# Patient Record
Sex: FEMALE | URBAN - METROPOLITAN AREA
[De-identification: names, ages, dates, MRNs, and addresses within clinical notes are randomized per-mention and may not be internally consistent; named-entity substitution may affect disease eponyms.]

---

## 2022-05-06 ENCOUNTER — LAB REQUISITION (OUTPATIENT)
Dept: LAB | Facility: CLINIC | Age: 22
End: 2022-05-06

## 2022-05-06 PROCEDURE — 86481 TB AG RESPONSE T-CELL SUSP: CPT | Performed by: INTERNAL MEDICINE

## 2022-05-08 LAB
GAMMA INTERFERON BACKGROUND BLD IA-ACNC: 0.01 IU/ML
M TB IFN-G BLD-IMP: NEGATIVE
M TB IFN-G CD4+ BCKGRND COR BLD-ACNC: 9.99 IU/ML
MITOGEN IGNF BCKGRD COR BLD-ACNC: 0 IU/ML
MITOGEN IGNF BCKGRD COR BLD-ACNC: 0.01 IU/ML
QUANTIFERON MITOGEN: 10 IU/ML
QUANTIFERON NIL TUBE: 0.01 IU/ML
QUANTIFERON TB1 TUBE: 0.01 IU/ML
QUANTIFERON TB2 TUBE: 0.02

## 2022-07-24 ENCOUNTER — HEALTH MAINTENANCE LETTER (OUTPATIENT)
Age: 22
End: 2022-07-24

## 2022-08-31 ASSESSMENT — ANXIETY QUESTIONNAIRES
6. BECOMING EASILY ANNOYED OR IRRITABLE: MORE THAN HALF THE DAYS
4. TROUBLE RELAXING: NOT AT ALL
7. FEELING AFRAID AS IF SOMETHING AWFUL MIGHT HAPPEN: SEVERAL DAYS
8. IF YOU CHECKED OFF ANY PROBLEMS, HOW DIFFICULT HAVE THESE MADE IT FOR YOU TO DO YOUR WORK, TAKE CARE OF THINGS AT HOME, OR GET ALONG WITH OTHER PEOPLE?: SOMEWHAT DIFFICULT
GAD7 TOTAL SCORE: 6
GAD7 TOTAL SCORE: 6
1. FEELING NERVOUS, ANXIOUS, OR ON EDGE: SEVERAL DAYS
5. BEING SO RESTLESS THAT IT IS HARD TO SIT STILL: SEVERAL DAYS
IF YOU CHECKED OFF ANY PROBLEMS ON THIS QUESTIONNAIRE, HOW DIFFICULT HAVE THESE PROBLEMS MADE IT FOR YOU TO DO YOUR WORK, TAKE CARE OF THINGS AT HOME, OR GET ALONG WITH OTHER PEOPLE: SOMEWHAT DIFFICULT
3. WORRYING TOO MUCH ABOUT DIFFERENT THINGS: NOT AT ALL
2. NOT BEING ABLE TO STOP OR CONTROL WORRYING: SEVERAL DAYS
7. FEELING AFRAID AS IF SOMETHING AWFUL MIGHT HAPPEN: SEVERAL DAYS

## 2022-09-07 ENCOUNTER — OFFICE VISIT (OUTPATIENT)
Dept: OBGYN | Facility: CLINIC | Age: 22
End: 2022-09-07
Attending: ADVANCED PRACTICE MIDWIFE
Payer: COMMERCIAL

## 2022-09-07 VITALS
HEIGHT: 66 IN | DIASTOLIC BLOOD PRESSURE: 80 MMHG | BODY MASS INDEX: 26.6 KG/M2 | WEIGHT: 165.5 LBS | SYSTOLIC BLOOD PRESSURE: 132 MMHG | HEART RATE: 86 BPM

## 2022-09-07 DIAGNOSIS — R87.612 PAPANICOLAOU SMEAR OF CERVIX WITH LOW GRADE SQUAMOUS INTRAEPITHELIAL LESION (LGSIL): ICD-10-CM

## 2022-09-07 DIAGNOSIS — Z30.430 ENCOUNTER FOR IUD INSERTION: Primary | ICD-10-CM

## 2022-09-07 DIAGNOSIS — Z30.430 ENCOUNTER FOR INSERTION OF INTRAUTERINE CONTRACEPTIVE DEVICE: ICD-10-CM

## 2022-09-07 DIAGNOSIS — Z12.4 SCREENING FOR MALIGNANT NEOPLASM OF CERVIX: ICD-10-CM

## 2022-09-07 LAB
HCG UR QL: NEGATIVE
INTERNAL QC OK POCT: NORMAL
POCT KIT EXPIRATION DATE: NORMAL
POCT KIT LOT NUMBER: NORMAL

## 2022-09-07 PROCEDURE — 81025 URINE PREGNANCY TEST: CPT | Performed by: ADVANCED PRACTICE MIDWIFE

## 2022-09-07 PROCEDURE — 250N000011 HC RX IP 250 OP 636: Performed by: ADVANCED PRACTICE MIDWIFE

## 2022-09-07 PROCEDURE — G0145 SCR C/V CYTO,THINLAYER,RESCR: HCPCS | Performed by: ADVANCED PRACTICE MIDWIFE

## 2022-09-07 PROCEDURE — 250N000013 HC RX MED GY IP 250 OP 250 PS 637: Performed by: ADVANCED PRACTICE MIDWIFE

## 2022-09-07 PROCEDURE — G0463 HOSPITAL OUTPT CLINIC VISIT: HCPCS | Mod: 25

## 2022-09-07 PROCEDURE — 58300 INSERT INTRAUTERINE DEVICE: CPT | Performed by: ADVANCED PRACTICE MIDWIFE

## 2022-09-07 RX ORDER — NORGESTIMATE AND ETHINYL ESTRADIOL 7DAYSX3 LO
1 KIT ORAL DAILY
COMMUNITY
Start: 2022-06-16 | End: 2022-09-07

## 2022-09-07 RX ORDER — IBUPROFEN 200 MG
200 TABLET ORAL ONCE
Status: COMPLETED | OUTPATIENT
Start: 2022-09-07 | End: 2022-09-07

## 2022-09-07 RX ADMIN — LEVONORGESTREL 20 MCG: 52 INTRAUTERINE DEVICE INTRAUTERINE at 16:11

## 2022-09-07 RX ADMIN — IBUPROFEN 600 MG: 200 TABLET, FILM COATED ORAL at 15:55

## 2022-09-07 ASSESSMENT — PATIENT HEALTH QUESTIONNAIRE - PHQ9
5. POOR APPETITE OR OVEREATING: NOT AT ALL
SUM OF ALL RESPONSES TO PHQ QUESTIONS 1-9: 8

## 2022-09-07 ASSESSMENT — ANXIETY QUESTIONNAIRES
7. FEELING AFRAID AS IF SOMETHING AWFUL MIGHT HAPPEN: NOT AT ALL
6. BECOMING EASILY ANNOYED OR IRRITABLE: SEVERAL DAYS
5. BEING SO RESTLESS THAT IT IS HARD TO SIT STILL: SEVERAL DAYS
IF YOU CHECKED OFF ANY PROBLEMS ON THIS QUESTIONNAIRE, HOW DIFFICULT HAVE THESE PROBLEMS MADE IT FOR YOU TO DO YOUR WORK, TAKE CARE OF THINGS AT HOME, OR GET ALONG WITH OTHER PEOPLE: SOMEWHAT DIFFICULT
2. NOT BEING ABLE TO STOP OR CONTROL WORRYING: NOT AT ALL
3. WORRYING TOO MUCH ABOUT DIFFERENT THINGS: SEVERAL DAYS
1. FEELING NERVOUS, ANXIOUS, OR ON EDGE: NOT AT ALL
GAD7 TOTAL SCORE: 3

## 2022-09-07 NOTE — PROGRESS NOTES
"  IUD Insertion:  CONSULT:    Is a pregnancy test required: Yes.  Was it positive or negative?  Negative  Was a consent obtained?  Yes    Subjective: Gianna Fairchild is a 22 year old  presents for IUD and desires Mirena type IUD.    Patient has been given the opportunity to ask questions about all forms of birth control, including all options appropriate for Gianna Fairchild. Discussed that no method of birth control, except abstinence is 100% effective against pregnancy or sexually transmitted infection.     Gianna Fairchild understands she may have the IUD removed at any time. IUD should be removed by a health care provider.    The entire insertion procedure was reviewed with the patient, including care after placement.    Patient's last menstrual period was 2022 (exact date). Last sexual activity: 5-6 months ago. No allergy to betadine or shellfish. Patient declines STD screening  No results found for: HCG      /80 (BP Location: Left arm, Patient Position: Sitting, Cuff Size: Adult Regular)   Pulse 86   Ht 1.676 m (5' 6\")   Wt 75.1 kg (165 lb 8 oz)   LMP 2022 (Exact Date)   Breastfeeding No   BMI 26.71 kg/m      Pelvic Exam:   EG/BUS: normal genital architecture without lesions, erythema or abnormal secretions.   Vagina: moist, pink, rugae with physiologic discharge and secretions  Pap smear obtained    PROCEDURE NOTE: -- IUD Insertion    Reason for Insertion: contraception    Premedicated with ibuprofen.  Under sterile technique, cervix was visualized with speculum and prepped with Betadine solution swab x 3. Tenaculum was placed for stability. The uterus was gently straightened and sounded to 7.0 cm. IUD prepared for placement, and IUD inserted according to 's instructions without difficulty or significant resitance, and deployed at the fundus. The strings were visualized and trimmed to 3.0 cm from the external os. Tenaculum was removed and hemostasis noted. Speculum " removed.  Patient tolerated procedure well.    Lot # [id number]  Exp: 10/2024    EBL: minimal    Complications: none    ASSESSMENT:     ICD-10-CM    1. Encounter for IUD insertion  Z30.430 levonorgestrel (MIRENA) 20 MCG/DAY IUD     levonorgestrel (MIRENA) 20 MCG/DAY IUD 20 mcg     INSERTION INTRAUTERINE DEVICE   2. Screening for malignant neoplasm of cervix  Z12.4 Obtaining, preparing and conveyance of cervical or vaginal smear to laboratory.     Pap thin layer screen only - recommended age 21 - 24 years   3. Encounter for insertion of intrauterine contraceptive device  Z30.430         PLAN:    Given 's handouts, including when to have IUD removed, list of danger s/sx, side effects and follow up recommended. Encouraged condom use for prevention of STD. Back up contraception advised for 7 days if progestin method. Advised to call for any fever, for prolonged or severe pain or bleeding, abnormal vaginal discharge, or unable to palpate strings. She was advised to use pain medications (ibuprofen) as needed for mild to moderate pain. Advised to follow-up in clinic in 4-6 weeks for IUD string check if unable to find strings or as directed by provider.     Leena Lemon, JONNY TOMAS    Answers for HPI/ROS submitted by the patient on 8/31/2022  KATI 7 TOTAL SCORE: 6

## 2022-09-07 NOTE — NURSING NOTE
Chief Complaint   Patient presents with     Physical     Annual/pap last pap was 05.07.2021 LSIL . Wants Mirena IUD

## 2022-09-07 NOTE — LETTER
"2022       RE: Gianna Fairchild  [address]  [address]  [facility] [address]     Dear Colleague,    Thank you for referring your patient, Gianna Fairchild, to the [facility] [place] at [facility]. Please see a copy of my visit note below.      IUD Insertion:  CONSULT:    Is a pregnancy test required: Yes.  Was it positive or negative?  Negative  Was a consent obtained?  Yes    Subjective: Gianna Fairchild is a 22 year old  presents for IUD and desires Mirena type IUD.    Patient has been given the opportunity to ask questions about all forms of birth control, including all options appropriate for Gianna Fairchild. Discussed that no method of birth control, except abstinence is 100% effective against pregnancy or sexually transmitted infection.     Gianna Fairchild understands she may have the IUD removed at any time. IUD should be removed by a health care provider.    The entire insertion procedure was reviewed with the patient, including care after placement.    Patient's last menstrual period was 2022 (exact date). Last sexual activity: 5-6 months ago. No allergy to betadine or shellfish. Patient declines STD screening  No results found for: HCG      /80 (BP Location: Left arm, Patient Position: Sitting, Cuff Size: Adult Regular)   Pulse 86   Ht 1.676 m (5' 6\")   Wt 75.1 kg (165 lb 8 oz)   LMP 2022 (Exact Date)   Breastfeeding No   BMI 26.71 kg/m      Pelvic Exam:   EG/BUS: normal genital architecture without lesions, erythema or abnormal secretions.   Vagina: moist, pink, rugae with physiologic discharge and secretions  Pap smear obtained    PROCEDURE NOTE: -- IUD Insertion    Reason for Insertion: contraception    Premedicated with ibuprofen.  Under sterile technique, cervix was visualized with speculum and prepped with Betadine solution swab x 3. Tenaculum was placed for stability. The uterus was gently " straightened and sounded to 7.0 cm. IUD prepared for placement, and IUD inserted according to 's instructions without difficulty or significant resitance, and deployed at the fundus. The strings were visualized and trimmed to 3.0 cm from the external os. Tenaculum was removed and hemostasis noted. Speculum removed.  Patient tolerated procedure well.    Lot # [id number]  Exp: 10/2024    EBL: minimal    Complications: none    ASSESSMENT:     ICD-10-CM    1. Encounter for IUD insertion  Z30.430 levonorgestrel (MIRENA) 20 MCG/DAY IUD     levonorgestrel (MIRENA) 20 MCG/DAY IUD 20 mcg     INSERTION INTRAUTERINE DEVICE   2. Screening for malignant neoplasm of cervix  Z12.4 Obtaining, preparing and conveyance of cervical or vaginal smear to laboratory.     Pap thin layer screen only - recommended age 21 - 24 years   3. Encounter for insertion of intrauterine contraceptive device  Z30.430         PLAN:    Given 's handouts, including when to have IUD removed, list of danger s/sx, side effects and follow up recommended. Encouraged condom use for prevention of STD. Back up contraception advised for 7 days if progestin method. Advised to call for any fever, for prolonged or severe pain or bleeding, abnormal vaginal discharge, or unable to palpate strings. She was advised to use pain medications (ibuprofen) as needed for mild to moderate pain. Advised to follow-up in clinic in 4-6 weeks for IUD string check if unable to find strings or as directed by provider.     JONNY Edwards CNM    Answers for HPI/ROS submitted by the patient on 8/31/2022  KATI 7 TOTAL SCORE: 6          Again, thank you for allowing me to participate in the care of your patient.      Sincerely,    JONNY Edwards CNM

## 2022-09-07 NOTE — LETTER
Date:September 8, 2022      Provider requested that no letter be sent. Do not send.       [facility]

## 2022-09-09 LAB
BKR LAB AP GYN ADEQUACY: NORMAL
BKR LAB AP GYN INTERPRETATION: NORMAL
BKR LAB AP HPV REFLEX: NO
BKR LAB AP LMP: NORMAL
BKR LAB AP PREVIOUS ABNL DX: NORMAL
BKR LAB AP PREVIOUS ABNORMAL: NORMAL
PATH REPORT.COMMENTS IMP SPEC: NORMAL
PATH REPORT.COMMENTS IMP SPEC: NORMAL
PATH REPORT.RELEVANT HX SPEC: NORMAL

## 2022-10-03 ENCOUNTER — HEALTH MAINTENANCE LETTER (OUTPATIENT)
Age: 22
End: 2022-10-03

## 2022-10-05 ENCOUNTER — OFFICE VISIT (OUTPATIENT)
Dept: OBGYN | Facility: CLINIC | Age: 22
End: 2022-10-05
Attending: ADVANCED PRACTICE MIDWIFE
Payer: COMMERCIAL

## 2022-10-05 VITALS
BODY MASS INDEX: 27.51 KG/M2 | WEIGHT: 171.2 LBS | HEIGHT: 66 IN | SYSTOLIC BLOOD PRESSURE: 128 MMHG | HEART RATE: 86 BPM | DIASTOLIC BLOOD PRESSURE: 84 MMHG

## 2022-10-05 DIAGNOSIS — Z01.419 WELL WOMAN EXAM WITH ROUTINE GYNECOLOGICAL EXAM: Primary | ICD-10-CM

## 2022-10-05 PROCEDURE — G0463 HOSPITAL OUTPT CLINIC VISIT: HCPCS

## 2022-10-05 PROCEDURE — 99395 PREV VISIT EST AGE 18-39: CPT | Performed by: ADVANCED PRACTICE MIDWIFE

## 2022-10-05 ASSESSMENT — ANXIETY QUESTIONNAIRES
1. FEELING NERVOUS, ANXIOUS, OR ON EDGE: SEVERAL DAYS
7. FEELING AFRAID AS IF SOMETHING AWFUL MIGHT HAPPEN: NOT AT ALL
3. WORRYING TOO MUCH ABOUT DIFFERENT THINGS: NOT AT ALL
3. WORRYING TOO MUCH ABOUT DIFFERENT THINGS: NOT AT ALL
6. BECOMING EASILY ANNOYED OR IRRITABLE: SEVERAL DAYS
4. TROUBLE RELAXING: SEVERAL DAYS
7. FEELING AFRAID AS IF SOMETHING AWFUL MIGHT HAPPEN: NOT AT ALL
5. BEING SO RESTLESS THAT IT IS HARD TO SIT STILL: MORE THAN HALF THE DAYS
GAD7 TOTAL SCORE: 4
GAD7 TOTAL SCORE: 7
2. NOT BEING ABLE TO STOP OR CONTROL WORRYING: NOT AT ALL
7. FEELING AFRAID AS IF SOMETHING AWFUL MIGHT HAPPEN: NOT AT ALL
5. BEING SO RESTLESS THAT IT IS HARD TO SIT STILL: NOT AT ALL
6. BECOMING EASILY ANNOYED OR IRRITABLE: MORE THAN HALF THE DAYS
IF YOU CHECKED OFF ANY PROBLEMS ON THIS QUESTIONNAIRE, HOW DIFFICULT HAVE THESE PROBLEMS MADE IT FOR YOU TO DO YOUR WORK, TAKE CARE OF THINGS AT HOME, OR GET ALONG WITH OTHER PEOPLE: SOMEWHAT DIFFICULT
2. NOT BEING ABLE TO STOP OR CONTROL WORRYING: SEVERAL DAYS
GAD7 TOTAL SCORE: 7
8. IF YOU CHECKED OFF ANY PROBLEMS, HOW DIFFICULT HAVE THESE MADE IT FOR YOU TO DO YOUR WORK, TAKE CARE OF THINGS AT HOME, OR GET ALONG WITH OTHER PEOPLE?: SOMEWHAT DIFFICULT
1. FEELING NERVOUS, ANXIOUS, OR ON EDGE: MORE THAN HALF THE DAYS

## 2022-10-05 ASSESSMENT — PATIENT HEALTH QUESTIONNAIRE - PHQ9
5. POOR APPETITE OR OVEREATING: SEVERAL DAYS
SUM OF ALL RESPONSES TO PHQ QUESTIONS 1-9: 8

## 2022-10-05 NOTE — PROGRESS NOTES
Progress Note    SUBJECTIVE:  Gianna Fairchild is an 22 year old, , who requests an Annual Preventive Exam.     Concerns today include: Feels well overall. No concerns and happy with her IUD so far. Endorses some mild unscheduled spotting and a lighter first period. Has not been sexually active since placement of IUD.    Menstrual History:  Menstrual History 2022 10/5/2022   LAST MENSTRUAL PERIOD 2022       Last Pap 2022: NILM; next pap due 2023  History of abnormal Pap smear: LGSIL on 2021, normal pap 2022, recommend 1-year follow-up with cytology per ASCCP guidelines.       Mammogram current: not applicable       Last Colonoscopy: not applicable        HISTORY:  Prescription Medications as of 10/5/2022       Rx Number Disp Refills Start End Last Dispensed Date Next Fill Date Owning Pharmacy    levonorgestrel (MIRENA) 20 MCG/DAY IUD        [facility] [address]    Si each (20 mcg) by Intrauterine route once    Class: Historical    Route: Intrauterine        No Known Allergies    There is no immunization history on file for this patient.    OB History    Para Term  AB Living   0 0 0 0 0 0   SAB IAB Ectopic Multiple Live Births   0 0 0 0 0     No past medical history on file.  No past surgical history on file.  Family History   Problem Relation Age of Onset     Heart Disease Mother      Hypertension Father      Social History     Socioeconomic History     Marital status: Single     Spouse name: None     Number of children: None     Years of education: None     Highest education level: None   Tobacco Use     Smoking status: Never Smoker     Smokeless tobacco: Never Used   Vaping Use     Vaping Use: Never used   Substance and Sexual Activity     Drug use: Never     Sexual activity: Yes     Partners: Male     Birth control/protection: Pill     Social Determinants of Health     Intimate Partner Violence: Not At Risk  "    Fear of Current or Ex-Partner: No     Emotionally Abused: No     Physically Abused: No     Sexually Abused: No       Review of Systems     All other systems reviewed and are negative.    PHQ-9 SCORE 9/7/2022 10/5/2022   PHQ-9 Total Score 8 8       EXAM:  Blood pressure 128/84, pulse 86, height 1.676 m (5' 6\"), weight 77.7 kg (171 lb 3.2 oz), last menstrual period 08/23/2022, not currently breastfeeding. Body mass index is 27.63 kg/m .  General - pleasant female in no acute distress.  Skin - no suspicious lesions or rashes  EENT-  PERRLA, euthyroid with out palpable nodules  Neck - supple without lymphadenopathy.  Lungs - clear to auscultation bilaterally.  Heart - regular rate and rhythm without murmur.  Abdomen - soft, nontender, nondistended, no masses or organomegaly noted.  Musculoskeletal - no gross deformities.  Neurological - normal strength, sensation, and mental status.      Pelvic Exam:  EG/BUS: Normal genital architecture without lesions, erythema or abnormal secretions. Bartholin's, Urethra, [place]'s normal   Bladder: no masses or tenderness   Vagina: moist, pink, rugae with creamy, white, odorless and physiologic discharge  secretions  Cervix: no lesions palpated and IUD strings extend ~3 cm from external os.  Uterus: midposition, and small, smooth, firm, mobile w/o pain  Adnexa: Within normal limits and No masses, nodularity, tenderness      ASSESSMENT:  Encounter Diagnosis   Name Primary?     Well woman exam with routine gynecological exam Yes        PLAN:   No orders of the defined types were placed in this encounter.       Additional teaching done at this visit regarding self breast exam, exercise and birth control- how to check for IUD strings.    Return to clinic in one year.  Follow-up as needed.    IKristin RN, NP-Student am serving as a scribe; to document services personally performed by  Leena Lemon CNM based on data collection and the provider's statements to me. "     Kristin Grover RN,  WHNP-Student     I agree with the PFSH and ROS as completed by Kristin Grover RN,  WHNP-Student except for changes made by me. The remainder of the encounter was performed by me and scribed by Kristin Grover RN,  WHNP-Student. The scribed note accurately reflects my personal services and decisions made by me.   JONNY Edwards CNM

## 2022-10-05 NOTE — LETTER
Date:October 7, 2022      Provider requested that no letter be sent. Do not send.       [facility]

## 2022-10-05 NOTE — LETTER
10/5/2022       RE: Gianna Fairchild  [address]  [address]  [facility] [address]     Dear Colleague,    Thank you for referring your patient, Gianna Fairchild, to the [facility] [place] at [facility]. Please see a copy of my visit note below.        Progress Note    SUBJECTIVE:  Gianna Fairchild is an 22 year old, , who requests an Annual Preventive Exam.     Concerns today include: Feels well overall. No concerns and happy with her IUD so far. Endorses some mild unscheduled spotting and a lighter first period. Has not been sexually active since placement of IUD.    Menstrual History:  Menstrual History 2022 10/5/2022   LAST MENSTRUAL PERIOD 2022       Last Pap 2022: NILM; next pap due 2023  History of abnormal Pap smear: LGSIL on 2021, normal pap 2022, recommend 1-year follow-up with cytology per ASCCP guidelines.       Mammogram current: not applicable       Last Colonoscopy: not applicable        HISTORY:  Prescription Medications as of 10/5/2022       Rx Number Disp Refills Start End Last Dispensed Date Next Fill Date Owning Pharmacy    levonorgestrel (MIRENA) 20 MCG/DAY IUD        [facility] [address]    Si each (20 mcg) by Intrauterine route once    Class: Historical    Route: Intrauterine        No Known Allergies    There is no immunization history on file for this patient.    OB History    Para Term  AB Living   0 0 0 0 0 0   SAB IAB Ectopic Multiple Live Births   0 0 0 0 0     No past medical history on file.  No past surgical history on file.  Family History   Problem Relation Age of Onset     Heart Disease Mother      Hypertension Father      Social History     Socioeconomic History     Marital status: Single     Spouse name: None     Number of children: None     Years of education: None     Highest education level: None   Tobacco  "Use     Smoking status: Never Smoker     Smokeless tobacco: Never Used   Vaping Use     Vaping Use: Never used   Substance and Sexual Activity     Drug use: Never     Sexual activity: Yes     Partners: Male     Birth control/protection: Pill     Social Determinants of Health     Intimate Partner Violence: Not At Risk     Fear of Current or Ex-Partner: No     Emotionally Abused: No     Physically Abused: No     Sexually Abused: No       Review of Systems     All other systems reviewed and are negative.    PHQ-9 SCORE 9/7/2022 10/5/2022   PHQ-9 Total Score 8 8       EXAM:  Blood pressure 128/84, pulse 86, height 1.676 m (5' 6\"), weight 77.7 kg (171 lb 3.2 oz), last menstrual period 08/23/2022, not currently breastfeeding. Body mass index is 27.63 kg/m .  General - pleasant female in no acute distress.  Skin - no suspicious lesions or rashes  EENT-  PERRLA, euthyroid with out palpable nodules  Neck - supple without lymphadenopathy.  Lungs - clear to auscultation bilaterally.  Heart - regular rate and rhythm without murmur.  Abdomen - soft, nontender, nondistended, no masses or organomegaly noted.  Musculoskeletal - no gross deformities.  Neurological - normal strength, sensation, and mental status.      Pelvic Exam:  EG/BUS: Normal genital architecture without lesions, erythema or abnormal secretions. Bartholin's, Urethra, [place]'s normal   Bladder: no masses or tenderness   Vagina: moist, pink, rugae with creamy, white, odorless and physiologic discharge  secretions  Cervix: no lesions palpated and IUD strings extend ~3 cm from external os.  Uterus: midposition, and small, smooth, firm, mobile w/o pain  Adnexa: Within normal limits and No masses, nodularity, tenderness      ASSESSMENT:  Encounter Diagnosis   Name Primary?     Well woman exam with routine gynecological exam Yes        PLAN:   No orders of the defined types were placed in this encounter.       Additional teaching done at this visit regarding self breast " exam, exercise and birth control- how to check for IUD strings.    Return to clinic in one year.  Follow-up as needed.    I, Kristin Grover RN, WHNP-Student am serving as a scribe; to document services personally performed by  Leena Lemon CNM based on data collection and the provider's statements to me.     Kristin Grover RN,  WHNP-Student     I agree with the PFSH and ROS as completed by Kristin Grover RN,  WHNP-Student except for changes made by me. The remainder of the encounter was performed by me and scribed by Kristin Grover RN,  WHNP-Student. The scribed note accurately reflects my personal services and decisions made by me.   JONNY Edwards CNM                Again, thank you for allowing me to participate in the care of your patient.      Sincerely,    JONNY Edwards CNM

## 2023-03-21 ENCOUNTER — OFFICE VISIT (OUTPATIENT)
Dept: OBGYN | Facility: CLINIC | Age: 23
End: 2023-03-21
Attending: ADVANCED PRACTICE MIDWIFE
Payer: COMMERCIAL

## 2023-03-21 VITALS
DIASTOLIC BLOOD PRESSURE: 83 MMHG | HEIGHT: 66 IN | SYSTOLIC BLOOD PRESSURE: 131 MMHG | WEIGHT: 171 LBS | HEART RATE: 83 BPM | BODY MASS INDEX: 27.48 KG/M2

## 2023-03-21 DIAGNOSIS — L70.8 OTHER ACNE: Primary | ICD-10-CM

## 2023-03-21 PROCEDURE — G0463 HOSPITAL OUTPT CLINIC VISIT: HCPCS | Performed by: ADVANCED PRACTICE MIDWIFE

## 2023-03-21 PROCEDURE — 99213 OFFICE O/P EST LOW 20 MIN: CPT | Performed by: ADVANCED PRACTICE MIDWIFE

## 2023-03-21 RX ORDER — ADAPALENE GEL USP, 0.3% 3 MG/G
GEL TOPICAL
Qty: 60 G | Refills: 3 | Status: SHIPPED | OUTPATIENT
Start: 2023-03-21

## 2023-03-21 NOTE — PROGRESS NOTES
"SUBJECTIVE:   Gianna Fairchild, 23 year female, presents to the clinic with concerns for increasing skin issues. She has a history of ance, states it \"was best managed on COCs\", but pt has Mirena IUD since Sept. 2022. Acne is worsening. She has tried OTC medications like salicylic acid which helps some. She is wondering if she needs a referral to see dermatology. She is happy with her IUD and would like to keep it in place.     OBJECTIVE:   Acne noted on face, shoulders, back of arms bilaterally. No signs of infection. Does not appear cyst-like.     ASSESSMENT/PLAN:     -Discussed that acne can be a side effect of Mirena IUD in about 10% of users.  -Prescribed Differin gel 0.3% to use daily.  -Dermatology referral placed.      Follow up and return to clinic as needed.     I, America Barber DNP Student, am serving as a scribe; to document services personally performed by JONNY Jain CNM based on data collection and the provider's statements to me.     America Barber DNP student    I agree with the PFSH and ROS as completed by America Barber DNP student except for changes made by me. The remainder of the encounter was performed by me and scribed by America Barber DNP student. The scribed note accurately reflects my personal services and decisions made by me.   JONNY Edwards CNM      "

## 2023-03-21 NOTE — LETTER
Date:March 22, 2023      Provider requested that no letter be sent. Do not send.       [facility]

## 2023-03-21 NOTE — LETTER
"3/21/2023       RE: Gianna Fairchild  [address]  [address]  [facility] [address]     Dear Colleague,    Thank you for referring your patient, Gianna Fairchild, to the [facility] [place] at [facility]. Please see a copy of my visit note below.    SUBJECTIVE:   Gianna Fairchild, 23 year female, presents to the clinic with concerns for increasing skin issues. She has a history of ance, states it \"was best managed on COCs\", but pt has Mirena IUD since Sept. 2022. Acne is worsening. She has tried OTC medications like salicylic acid which helps some. She is wondering if she needs a referral to see dermatology. She is happy with her IUD and would like to keep it in place.     OBJECTIVE:   Acne noted on face, shoulders, back of arms bilaterally. No signs of infection. Does not appear cyst-like.     ASSESSMENT/PLAN:     -Discussed that acne can be a side effect of Mirena IUD in about 10% of users.  -Prescribed Differin gel 0.3% to use daily.  -Dermatology referral placed.      Follow up and return to clinic as needed.     I, America Barber DNP Student, am serving as a scribe; to document services personally performed by JONNY Jain CNM based on data collection and the provider's statements to me.     America Barber DNP student    I agree with the PFSH and ROS as completed by America Barber DNP student except for changes made by me. The remainder of the encounter was performed by me and scribed by America Barber DNP student. The scribed note accurately reflects my personal services and decisions made by me.   JONNY Edwards CNM          Again, thank you for allowing me to participate in the care of your patient.      Sincerely,    JONNY Edwards CNM      "

## 2023-05-14 ENCOUNTER — MYC MEDICAL ADVICE (OUTPATIENT)
Dept: OBGYN | Facility: CLINIC | Age: 23
End: 2023-05-14
Payer: COMMERCIAL

## 2023-05-14 DIAGNOSIS — Z11.3 ROUTINE SCREENING FOR STI (SEXUALLY TRANSMITTED INFECTION): Primary | ICD-10-CM

## 2023-05-15 ENCOUNTER — LAB (OUTPATIENT)
Dept: LAB | Facility: CLINIC | Age: 23
End: 2023-05-15
Payer: COMMERCIAL

## 2023-05-15 DIAGNOSIS — Z11.3 ROUTINE SCREENING FOR STI (SEXUALLY TRANSMITTED INFECTION): ICD-10-CM

## 2023-05-15 PROCEDURE — 87491 CHLMYD TRACH DNA AMP PROBE: CPT

## 2023-05-15 PROCEDURE — 87591 N.GONORRHOEAE DNA AMP PROB: CPT

## 2023-05-16 LAB
C TRACH DNA SPEC QL NAA+PROBE: NEGATIVE
N GONORRHOEA DNA SPEC QL NAA+PROBE: NEGATIVE

## 2023-09-15 ENCOUNTER — OFFICE VISIT (OUTPATIENT)
Dept: DERMATOLOGY | Facility: CLINIC | Age: 23
End: 2023-09-15
Attending: ADVANCED PRACTICE MIDWIFE
Payer: COMMERCIAL

## 2023-09-15 DIAGNOSIS — L70.8 OTHER ACNE: ICD-10-CM

## 2023-09-15 DIAGNOSIS — L70.0 ACNE VULGARIS: Primary | ICD-10-CM

## 2023-09-15 PROCEDURE — 99204 OFFICE O/P NEW MOD 45 MIN: CPT | Mod: GC | Performed by: DERMATOLOGY

## 2023-09-15 RX ORDER — TRETINOIN 0.25 MG/G
CREAM TOPICAL AT BEDTIME
Qty: 45 G | Refills: 2 | Status: SHIPPED | OUTPATIENT
Start: 2023-09-15

## 2023-09-15 RX ORDER — BENZOYL PEROXIDE 10 G/100G
SUSPENSION TOPICAL
Qty: 148 ML | Refills: 2 | Status: SHIPPED | OUTPATIENT
Start: 2023-09-15

## 2023-09-15 RX ORDER — SPIRONOLACTONE 50 MG/1
TABLET, FILM COATED ORAL
Qty: 67 TABLET | Refills: 4 | Status: SHIPPED | OUTPATIENT
Start: 2023-09-15 | End: 2024-01-26

## 2023-09-15 RX ORDER — CLINDAMYCIN PHOSPHATE 10 UG/ML
LOTION TOPICAL DAILY
Qty: 60 ML | Refills: 2 | Status: SHIPPED | OUTPATIENT
Start: 2023-09-15

## 2023-09-15 ASSESSMENT — PAIN SCALES - GENERAL: PAINLEVEL: NO PAIN (0)

## 2023-09-15 NOTE — LETTER
9/15/2023       RE: Gianna Fairchild  [address]  [facility] [address]     Dear Colleague,    Thank you for referring your patient, Gianna Fairchild, to the [facility] [place] at [facility]. Please see a copy of my visit note below.    [facility] Dermatology Note  Encounter Date: Sep 15, 2023  Office Visit     Dermatology Problem List:  # Acne Vulgaris , probable hormonal component   - Previous tx: OCP, adapalene, BPO wash   - Current tx: BPO, clinda, tretinoin 0.025%, spironolactone   - Future tx: tretinoin 0.05%, doxycyline and Accutane   ____________________________________________    Assessment & Plan:     # Acne Vulgaris - chronic, active.  Discussed that acne is secondary to follicular occlusion which is exacerbated by hormonal influence. Treatments were discussed at length including topical agents and systemic medications. At this time, plan to do an optimal topical regimen in addition to starting oral spironolactone. Risks and benefits of this were discussed and she understood and agreed to this plan. All questions were answered. Plan for follow up in 3-4 months for recheck .  - Start spironolactone 50 mg daily x 1 week, then 100 mg daily if tolerate  -Start tretinoin 0.025% cream qHS. Counseled on side effects of xerosis. Recommended to start every other night, then increase to nightly as tolerated. Follow with non-comedogenic moisturizer.  -Start clindamycin lotion daily in AM.  -Start benzoyl peroxide 4-5% wash daily in shower. Counseled can bleach towels and clothing.  - Has IUD    Procedures Performed:   None    Follow-up: 3 - 4 month(s) in-person, or earlier for new or changing lesions    Staff and Resident:   Dr. Heart and Ami Moore, DO     Staff Physician Comments:   I saw and evaluated the patient with the resident and I agree with the assessment and plan.  I was present for the  examination.    Justine Heart MD    Department of Dermatology  [facility]: Phone: [phone], Fax: [phone]  9/18/2023     ____________________________________________    CC: Derm Problem (Acne (face and shoulders) and eczema (flank and arms).  Getting worse now that she is not on treatment (birth control and tretinoin).  Uses salicylic acid, toner, sunscreen, moisturizer, adapalene (didn't help).     )    HPI:  Ms. Gianna Fairchild is a(n) 23 year old female who presents today as a new patient for ance.     - reports flaring with menstrual cycle, some intermittent acne pustules along the chin line    - acne to forehead, bilateral cheeks and occasional along chin line   - occasional pustules   - discontinued her adapalene a couple months ago   - has been using salicylic acid, otherwise no other treatments     Patient is otherwise feeling well, without additional skin concerns.    Labs Reviewed:  N/A    Physical Exam:  Vitals: There were no vitals taken for this visit.  SKIN: Acne exam, which includes the face, neck, was performed.  - There are superifical acneiform papules with intermixed open and closed comedones on the forehead, bilateral cheeks and along chin line.   - No other lesions of concern on areas examined.     Medications:  Current Outpatient Medications   Medication    levonorgestrel (MIRENA) 20 MCG/DAY IUD    adapalene (DIFFERIN) 0.3 % external gel     No current facility-administered medications for this visit.      Past Medical History:   Patient Active Problem List   Diagnosis    Papanicolaou smear of cervix with low grade squamous intraepithelial lesion (LGSIL)    Encounter for IUD insertion     No past medical history on file.    CC JONNY Edwards CNM  [address]  [place] [address] on close of this encounter.

## 2023-09-15 NOTE — NURSING NOTE
Dermatology Rooming Note    Gianna Fairchild's goals for this visit include:   Chief Complaint   Patient presents with    Derm Problem     Acne (face and shoulders) and eczema (flank and arms).  Getting worse now that she is not on treatment (birth control and tretinoin).  Uses salicylic acid, toner, sunscreen, moisturizer, adapalene (didn't help).          Jessica Madrid, CMA

## 2023-09-15 NOTE — PATIENT INSTRUCTIONS
"Your Acne Plan:  Good face wash and moisturizer brands: Cerave ($), Vanicream ($), La Roche Posay ($$), Skin Ceuticals ($$$)    Morning:   - Wash with benzoyl peroxide wash (face and body if needed)  - Apply clindamycin to  active pimples (face and body if needed)  - Apply moisturizer   - Apply sunscreen with SPF 30    Evening:  - Wash with a gentle cleanser (brands listed below) or benzoyl peroxide if your skin tolerates it  - Apply clindamycin to active pimples (face and body if needed)  - Apply retinoid cream (tretinoin) pea sized amount to entire face (not just to acne bumps): start every 3rd night then increase to every night as tolerated  - Apply moisturizer   _____________________________    Benzoyl Peroxide - can be used safely on the face and body 1-2 times a day     This is an ingredient in many over the counter acne washes.  Make sure you look at the active ingredient list to ensure this is what is in the facial wash.  Benzoyl peroxide can range from 2.5% to 10%.  It does not matter which one you purchase, although the lower strengths tend to be less irritating to the skin with the same efficacy. Sometimes the lower percentages are labeled as \"Sensitive.\" I recommend anything between 2.5-5%. Be sure to rinse it off well or it can bleach your clothing/towels.     Here are easy-to-find brands that have the benzoyl peroxide ingredient:  - CeraVe Acne Foaming Cream Cleanser   - Neutrogena Clear Pore  - All AcneFree washes  - All PanOxyl washes  - Clean and Clear Continuous Control    You can find these in the acne isle at many grocery stores and pharmacies.  Be sure to check the ingredients as many acne washes actually contain salicylic acid and not benzoyl peroxide.        _____________________________    Retinoids (Differin/Adapalene/Tretinoin/Tazorac/Tazarotene)    - Differin is available over the counter, all others require a prescription.  - Retinoids unplug the pores, so they will help with the acne you " "have and also to prevent new pimples from forming. They also help treat the discoloration caused by acne.   - Topical retinoids are very, very good for acne. However, they take 3 months to work. Give them their chance! Keep at it!  - After washing your face at nighttime, apply a small pea-sized amount of your retinoid thinly and evenly across the face. Do not just put it on the pimples; put it all over, and leave it on (do not rinse off). Do not apply during the day as this medication is inactivated by the sunlight and also make you more sensitive to the sun.   - This medication can cause redness and irritation, however, this will get better as you continue using the product. Start by using this medication 1-3 times per night, and then gradually build up to every other night and then every night as you tolerate. You can also use more moisturizer (either apply on first, or mix in with the medicated cream). Make sure the moisturizer does not contain other \"active\" ingredients. Cerave or La Roche Posay are great options.   - Some people might notice that their acne gets a little worse after starting this medication. This is because all of the clogged pores are becoming unclogged. If this happens, do not give up, keep using the medication.   - Retinoids make your skin more sensitive, so if you have another skin treatment (like a facial or eyebrow or other waxing) planned, be sure to let the salon person know.   - Do not use if you are pregnant or breastfeeding.     _____________________________    Spironolactone  - Start taking spironolactone 1/2 pill (50 mg) once a day for 1 week and then increase to 100 mg pill once a day if tolerated  - Common side effects include dizziness/lightheadedness, breast tenderness, and irregular menses/spotting. This usually goes away as your body gets used to the medication.   - If you are having lightheadedness/dizziness, try increasing your fluid intake.  - Do not take potassium " supplements while on this medication.  - You should not become pregnant while taking this medication (stop this medication immediately if pregnancy is suspected)  - Do not take the antibiotic Bactrim or ciprofloxacin while you are on this medication.     _____________________________      What is acne?   - Acne is a disease of the skin pores (oil glands and ducts) of the face, back, chest and sometimes the arms. The oil ducts get clogged, then bacteria sets in. The skin s immune system responds to the clogged pores and bacteria and creates inflammation in the skin, so the clogged pores become swollen, hard, red, and painful. This process can lead to scarring and discoloration (called post inflammatory hyperpigmentation), especially in people with darker skin types.  - Hormones (generally testosterone) make acne worse.   - Sweating, too much oil or makeup that clogs up pores, and lack of consistent face washing can also make acne worse.   - Food can play a role for some people. Hennessey with limiting dairy, sugary foods, and gluten to see if you notice an improvement.    - A good resource is the Diet and Acne research done by [facility]. They even have a free cortney!     Why is  popping a pimple  bad?   - Because it lets the pus and bacteria out into the surrounding skin therefore spreading out the inflammation. Popping a larger or deeper pimple/nodule is worse than popping a little nunez.     How often should you wash your face?   - Twice a day, morning and night. And anytime after excessive sweating (ex: working out). Excessive washing can dry the skin and make acne worse.     What are some good moisturizers?  - Use a FACIAL moisturizer (not a body lotion).   - Do not use water-proof or water-resistant sunscreen on your face or acne-prone areas.   - Stick with well-known, tried-and-true name brands, like Neutrogena, Cerave, Vanicream, La Roche Posay, Clinique, Skin Ceuticals.   - Make sure what you  "are using on your face says \"non-comedogenic\" (non-pimple-casuing) on it. Just because a product says \"face\" on it doesn't mean it won't cause acne.    Can I use make up?   - Make-up is generally fine. Choose water-based products.  - Avoid pressed-powder, which contain more oils and waxes.  - People tend do well with mineral-based make-ups.  - Look for \"non-comedogenic,\" which means it does not clog pores.  - Wash make brushes and sponges with soap regularly.   - Do not share make up or brushes/sponges.     What is acne scarring?   - Caused by inflammation from acne. Can be either discoloration or depressed marks in the skin.   - Flat red/brown marks are not true scars. They occur naturally with healing and will gradually disappear. Topical azelaic acid (like The Ordinary Azelaic Acid Suspension) or retinoids (like tretinoin) might help. Talk to your doctor about what treatment is best for you.   - Depressed marks/pits are true scars and are much harder to treat. Therefore, it is imperative to control acne before treating any deeper, pitted acne scarring.   - Use a sunscreen with only zinc or titanium (also called physical or mineral sunscreens) every day to help prevent discoloration.   - Chemical peels and lasers can help with both discoloration and true scars after acne is controlled.     Other prescriptions to treat acne:   - Antibiotics are sometimes necessary to decrease inflammation in the skin and reduce bacterial counts, and thus helping to prevent pimple formation. These can be either in the form of a topical product or an oral medication.   - Women only: birth control pills to regulate the female cycle or spironolactone to block the skin receptors from reacting to hormones so much   - Isotretinoin, a pill form of  retinoids,  derived from vitamin A, requires 4-6 months of therapy usually.     Final Tips:  - Wash twice a day and after sweating. Perspiration, especially when wearing a hat or helmet, can " make acne worse, so wash your skin as soon as possible after sweating.  - Use your fingertips to apply a gentle, non-abrasive cleanser. Using a washcloth, mesh sponge or anything else can irritate the skin and is not recommended.  - Be gentle with your skin. Use gentle products, such as those that are alcohol-free. Do not use products that irritate your skin, which may include astringents, toners and exfoliants. Dry, red skin makes acne appear worse.  - Scrubbing your skin can make acne worse. Avoid the temptation to scrub your skin.  - Rinse with lukewarm water. Avoid very cold or hot water.   - Shampoo regularly. If you have oily hair, shampoo daily.  - Let your skin heal naturally. If you pick, pop or squeeze your acne, your skin will take longer to clear and you increase the risk of getting acne scars. Keep your hands off your face. Touching your skin throughout the day can cause flare-ups.  - Stay out of the sun and tanning beds. Tanning damages you skin. In addition, some acne medications make the skin very sensitive to ultraviolet (UV) light, which you get from both the sun and indoor tanning devices.       Gentle Skin Care  Below is a list of products our providers recommend for gentle skin care.  Daily bathing is recommended. Make sure you are applying a good moisturizer after bathing every time.  Use Moisturizing creams at least twice daily to the whole body. Your provider may recommend a lighter or heavier moisturizer   Lighter, more pleasing to the feel moisturizers include products such as; Cetaphil, Cerave, Aveeno and Vanicream light.   Thicker agents include; Aquaphor ointment, Vaseline, Eucerin and Vanicream.  Creams are more moisturizing than lotions  Products should be fragrance free- soaps, creams, detergents.  Mild Bar Soaps include;   Fragrance Free Dove, Basis and Purpose  Mild Liquid Cleansers;  Vanicream, Cetaphil, Aquanil, Cerave and Aquaphor  Laundry Products include;  All Free and Clear,  Eren, and Cheer Free  Care Plan:  Keep bathing and showering short, less than 15 mins  Always use lukewarm warm when possible. AVOID very HOT or COLD water  DO NOT use bubble bath  Limit the use of soaps. Focus on  dirty  areas of the body; face, armpits, groin and feet  Do NOT vigorously scrub when you cleanse your skin  After bathing, PAT your skin lightly with a towel. DO NOT rub or scrub when drying  ALWAYS apply a moisturizer immediately after bathing. This helps to  lock in  the moisture. * IF YOU WERE PRESCRIBED A TOPICAL MEDICATION, APPLY YOUR MEDICATION FIRST THEN COVER WITH YOUR DAILY MOISTURIZER  Reapply moisturizing agents at least twice daily to your whole body  Do not use products such as powders, perfumes, or colognes on your skin  Avoid saunas and steam baths. This temperature is too HOT  Use unscented hypo-allergenic laundry products. AVOID fabric softeners  and dryer sheets  Avoid tight or  scratchy  clothing such as wool  Always wash new clothing before wearing them for the first time  Sometimes a humidifier or vaporizer, used at night can help the dry skin. Remember to keep it clean to avoid mold growth.

## 2023-09-15 NOTE — PROGRESS NOTES
[facility] Dermatology Note  Encounter Date: Sep 15, 2023  Office Visit     Dermatology Problem List:  # Acne Vulgaris , probable hormonal component   - Previous tx: OCP, adapalene, BPO wash   - Current tx: BPO, clinda, tretinoin 0.025%, spironolactone   - Future tx: tretinoin 0.05%, doxycyline and Accutane   ____________________________________________    Assessment & Plan:     # Acne Vulgaris - chronic, active.  Discussed that acne is secondary to follicular occlusion which is exacerbated by hormonal influence. Treatments were discussed at length including topical agents and systemic medications. At this time, plan to do an optimal topical regimen in addition to starting oral spironolactone. Risks and benefits of this were discussed and she understood and agreed to this plan. All questions were answered. Plan for follow up in 3-4 months for recheck .  - Start spironolactone 50 mg daily x 1 week, then 100 mg daily if tolerate  -Start tretinoin 0.025% cream qHS. Counseled on side effects of xerosis. Recommended to start every other night, then increase to nightly as tolerated. Follow with non-comedogenic moisturizer.  -Start clindamycin lotion daily in AM.  -Start benzoyl peroxide 4-5% wash daily in shower. Counseled can bleach towels and clothing.  - Has IUD    Procedures Performed:   None    Follow-up: 3 - 4 month(s) in-person, or earlier for new or changing lesions    Staff and Resident:   Dr. Heart and Ami Moore,      Staff Physician Comments:   I saw and evaluated the patient with the resident and I agree with the assessment and plan.  I was present for the examination.    Justine Heart MD    Department of Dermatology  [facility]: Phone: [phone], Fax: [phone]  9/18/2023     ____________________________________________    CC: Derm Problem (Acne (face and shoulders) and  eczema (flank and arms).  Getting worse now that she is not on treatment (birth control and tretinoin).  Uses salicylic acid, toner, sunscreen, moisturizer, adapalene (didn't help).     )    HPI:  Ms. Gianna Fairchild is a(n) 23 year old female who presents today as a new patient for ance.     - reports flaring with menstrual cycle, some intermittent acne pustules along the chin line    - acne to forehead, bilateral cheeks and occasional along chin line   - occasional pustules   - discontinued her adapalene a couple months ago   - has been using salicylic acid, otherwise no other treatments     Patient is otherwise feeling well, without additional skin concerns.    Labs Reviewed:  N/A    Physical Exam:  Vitals: There were no vitals taken for this visit.  SKIN: Acne exam, which includes the face, neck, was performed.  - There are superifical acneiform papules with intermixed open and closed comedones on the forehead, bilateral cheeks and along chin line.   - No other lesions of concern on areas examined.     Medications:  Current Outpatient Medications   Medication    levonorgestrel (MIRENA) 20 MCG/DAY IUD    adapalene (DIFFERIN) 0.3 % external gel     No current facility-administered medications for this visit.      Past Medical History:   Patient Active Problem List   Diagnosis    Papanicolaou smear of cervix with low grade squamous intraepithelial lesion (LGSIL)    Encounter for IUD insertion     No past medical history on file.    CC JONNY Edwards CNM  [address]  [place] [address] on close of this encounter.

## 2023-12-31 ENCOUNTER — HEALTH MAINTENANCE LETTER (OUTPATIENT)
Age: 23
End: 2023-12-31

## 2024-01-26 ENCOUNTER — OFFICE VISIT (OUTPATIENT)
Dept: DERMATOLOGY | Facility: CLINIC | Age: 24
End: 2024-01-26
Payer: COMMERCIAL

## 2024-01-26 DIAGNOSIS — L20.84 INTRINSIC ATOPIC DERMATITIS: Primary | ICD-10-CM

## 2024-01-26 DIAGNOSIS — L70.8 OTHER ACNE: ICD-10-CM

## 2024-01-26 DIAGNOSIS — L70.0 ACNE VULGARIS: ICD-10-CM

## 2024-01-26 PROCEDURE — 99214 OFFICE O/P EST MOD 30 MIN: CPT | Performed by: DERMATOLOGY

## 2024-01-26 RX ORDER — TRIAMCINOLONE ACETONIDE 1 MG/G
OINTMENT TOPICAL 2 TIMES DAILY
Qty: 80 G | Refills: 3 | Status: SHIPPED | OUTPATIENT
Start: 2024-01-26

## 2024-01-26 RX ORDER — SPIRONOLACTONE 50 MG/1
150 TABLET, FILM COATED ORAL DAILY
Qty: 270 TABLET | Refills: 3 | Status: SHIPPED | OUTPATIENT
Start: 2024-01-26

## 2024-01-26 RX ORDER — ESCITALOPRAM OXALATE 5 MG/1
TABLET ORAL
COMMUNITY
Start: 2024-01-05

## 2024-01-26 RX ORDER — QUETIAPINE FUMARATE 25 MG/1
1 TABLET, FILM COATED ORAL
COMMUNITY
Start: 2023-12-19

## 2024-01-26 ASSESSMENT — PAIN SCALES - GENERAL: PAINLEVEL: NO PAIN (0)

## 2024-01-26 NOTE — PROGRESS NOTES
[facility] Dermatology Note  Encounter Date: Jan 26, 2024  Office Visit     Dermatology Problem List:  1. Acne Vulgaris , probable hormonal component   - Current tx: BPO, clinda, tretinoin 0.025%, spironolactone   - Previous tx: OCP, adapalene, BPO wash   - Future tx: tretinoin 0.05%, doxycyline and Accutane   2. Intrinsic atopic dermatitis   - Current tx: triamcinolone 0.1% ointment BID prn.  ____________________________________________    Assessment & Plan:    # Intrinsic atopic dermatitis - chronic, active.  - Advised daily moisturization with bland emollient.   - Start triamcinolone 0.1% ointment BID prn.    # Acne Vulgaris - chronic, active. Improving  Improved, though not at goal. Discussed increasing spironolactone and continuing topicals for now.  - Increased spironolactone to 150 mg daily as tolerated.  -Continue tretinoin 0.025% cream qHS. Follow with non-comedogenic moisturizer.  -Continue clindamycin lotion daily in AM.  -Continue benzoyl peroxide 4-5% wash daily in shower. Counseled can bleach towels and clothing.  - Has IUD      Procedures Performed:   None.    Follow-up: 3 month(s) in-person, or earlier for new or changing lesions    Staff and Scribe:     Scribe Disclosure:   IJON, am serving as a scribe; to document services personally performed by Justine Heart MD -based on data collection and the provider's statements to me.    Provider Disclosure:   The documentation recorded by the scribe accurately reflects the services I personally performed and the decisions made by me.    Justine Heart MD    Department of Dermatology  [facility]: Phone: [phone], Fax: [phone]  1/30/2024     ____________________________________________    CC: Acne (Gianna is here today for a acne followup )    HPI:  Ms. Ginana Fairchild is a(n) 23 year old female who presents  today as a return patient for Acne.    She feels her acne has improved by about 50%. Only concern is nose peeling. Denies breast tenderness, GI symptoms.    Patient is otherwise feeling well, without additional skin concerns.    Labs Reviewed:  N/A    Physical Exam:  Vitals: There were no vitals taken for this visit.  SKIN: Focused examination of the face was performed.  - Some hyperpigmented papules and macules of lower cheek and chin.  - Some hyperpigmented papules on wrist.  - No other lesions of concern on areas examined.     Medications:  Current Outpatient Medications   Medication    benzoyl peroxide (PANOXYL) 10 % external liquid    clindamycin (CLEOCIN T) 1 % external lotion    escitalopram (LEXAPRO) 5 MG tablet    levonorgestrel (MIRENA) 20 MCG/DAY IUD    QUEtiapine (SEROQUEL) 25 MG tablet    tretinoin (RETIN-A) 0.025 % external cream    adapalene (DIFFERIN) 0.3 % external gel    spironolactone (ALDACTONE) 50 MG tablet     No current facility-administered medications for this visit.      Past Medical History:   Patient Active Problem List   Diagnosis    Papanicolaou smear of cervix with low grade squamous intraepithelial lesion (LGSIL)    Encounter for IUD insertion     No past medical history on file.     CC No referring provider defined for this encounter. on close of this encounter.

## 2024-01-26 NOTE — LETTER
1/26/2024       RE: Gianna Fairchild  [address]  [place] [address]     Dear Colleague,    Thank you for referring your patient, Gianna Fairchild, to the [facility] [place] at [facility]. Please see a copy of my visit note below.    [facility] Dermatology Note  Encounter Date: Jan 26, 2024  Office Visit     Dermatology Problem List:  1. Acne Vulgaris , probable hormonal component   - Current tx: BPO, clinda, tretinoin 0.025%, spironolactone   - Previous tx: OCP, adapalene, BPO wash   - Future tx: tretinoin 0.05%, doxycyline and Accutane   2. Intrinsic atopic dermatitis   - Current tx: triamcinolone 0.1% ointment BID prn.  ____________________________________________    Assessment & Plan:    # Intrinsic atopic dermatitis - chronic, active.  - Advised daily moisturization with bland emollient.   - Start triamcinolone 0.1% ointment BID prn.    # Acne Vulgaris - chronic, active. Improving  Improved, though not at goal. Discussed increasing spironolactone and continuing topicals for now.  - Increased spironolactone to 150 mg daily as tolerated.  -Continue tretinoin 0.025% cream qHS. Follow with non-comedogenic moisturizer.  -Continue clindamycin lotion daily in AM.  -Continue benzoyl peroxide 4-5% wash daily in shower. Counseled can bleach towels and clothing.  - Has IUD      Procedures Performed:   None.    Follow-up: 3 month(s) in-person, or earlier for new or changing lesions    Staff and Scribe:     Scribe Disclosure:   I, JON COKER, am serving as a scribe; to document services personally performed by Justine Heart MD -based on data collection and the provider's statements to me.    Provider Disclosure:   The documentation recorded by the scribe accurately reflects the services I personally performed and the decisions made by me.    Justine Heart MD    Department of  Dermatology  [facility]: Phone: [phone], Fax: [phone]  1/30/2024     ____________________________________________    CC: Acne (Gianna is here today for a acne followup )    HPI:  Ms. Gianna Fairchild is a(n) 23 year old female who presents today as a return patient for Acne.    She feels her acne has improved by about 50%. Only concern is nose peeling. Denies breast tenderness, GI symptoms.    Patient is otherwise feeling well, without additional skin concerns.    Labs Reviewed:  N/A    Physical Exam:  Vitals: There were no vitals taken for this visit.  SKIN: Focused examination of the face was performed.  - Some hyperpigmented papules and macules of lower cheek and chin.  - Some hyperpigmented papules on wrist.  - No other lesions of concern on areas examined.     Medications:  Current Outpatient Medications   Medication    benzoyl peroxide (PANOXYL) 10 % external liquid    clindamycin (CLEOCIN T) 1 % external lotion    escitalopram (LEXAPRO) 5 MG tablet    levonorgestrel (MIRENA) 20 MCG/DAY IUD    QUEtiapine (SEROQUEL) 25 MG tablet    tretinoin (RETIN-A) 0.025 % external cream    adapalene (DIFFERIN) 0.3 % external gel    spironolactone (ALDACTONE) 50 MG tablet     No current facility-administered medications for this visit.      Past Medical History:   Patient Active Problem List   Diagnosis    Papanicolaou smear of cervix with low grade squamous intraepithelial lesion (LGSIL)    Encounter for IUD insertion     No past medical history on file.     CC No referring provider defined for this encounter. on close of this encounter.

## 2024-11-06 ASSESSMENT — ANXIETY QUESTIONNAIRES
GAD7 TOTAL SCORE: 3
GAD7 TOTAL SCORE: 4

## 2025-01-19 ENCOUNTER — HEALTH MAINTENANCE LETTER (OUTPATIENT)
Age: 25
End: 2025-01-19

## 2025-07-14 ENCOUNTER — MYC REFILL (OUTPATIENT)
Dept: DERMATOLOGY | Facility: CLINIC | Age: 25
End: 2025-07-14
Payer: COMMERCIAL

## 2025-07-14 DIAGNOSIS — L20.84 INTRINSIC ATOPIC DERMATITIS: ICD-10-CM

## 2025-07-17 RX ORDER — TRIAMCINOLONE ACETONIDE 1 MG/G
OINTMENT TOPICAL 2 TIMES DAILY
Qty: 80 G | Refills: 3 | OUTPATIENT
Start: 2025-07-17

## 2025-07-17 NOTE — TELEPHONE ENCOUNTER
LVD:  1/26/24 [facility] DERM  Medication:    triamcinolone (KENALOG) 0.1 % external ointment 80 g 3 1/26/2024 -- No   Sig - Route: Apply topically 2 times daily To eczema as needed - Topical     Refill decision: Medication  denied per  Medication Refill in Ambulatory Care  policy.  Derm-Allergy Refill Protocol  Process #1    -Refill qty to 12 months from last clinic visit   -Refill with or without scheduled appointment, Refuse if over 12 months.    -No need to contact Clinic Coordinators about appointment unless over 12 mo.

## 2025-07-17 NOTE — TELEPHONE ENCOUNTER
7/17 Left Voicemail (1st Attempt) and Sent Mychart (1st Attempt) for the patient to call back and schedule the following:    Appointment type: Return Dermatology  Provider: Sly  Return date: Next Available   Specialty phone number: [phone]  Additional appointment(s) needed: na  Additonal Notes: Medication Refill

## 2025-08-12 ENCOUNTER — TELEPHONE (OUTPATIENT)
Dept: DERMATOLOGY | Facility: CLINIC | Age: 25
End: 2025-08-12
Payer: COMMERCIAL